# Patient Record
Sex: FEMALE | Race: WHITE | NOT HISPANIC OR LATINO | Employment: FULL TIME | URBAN - METROPOLITAN AREA
[De-identification: names, ages, dates, MRNs, and addresses within clinical notes are randomized per-mention and may not be internally consistent; named-entity substitution may affect disease eponyms.]

---

## 2023-03-01 LAB
ALANINE AMINOTRANSFERASE (SGPT) (U/L) IN SER/PLAS: 7 U/L (ref 7–45)
ALBUMIN (G/DL) IN SER/PLAS: 4.7 G/DL (ref 3.4–5)
ALKALINE PHOSPHATASE (U/L) IN SER/PLAS: 52 U/L (ref 33–110)
ANION GAP IN SER/PLAS: 14 MMOL/L (ref 10–20)
ASPARTATE AMINOTRANSFERASE (SGOT) (U/L) IN SER/PLAS: 13 U/L (ref 9–39)
BILIRUBIN TOTAL (MG/DL) IN SER/PLAS: 0.6 MG/DL (ref 0–1.2)
CALCIUM (MG/DL) IN SER/PLAS: 10.1 MG/DL (ref 8.6–10.6)
CARBON DIOXIDE, TOTAL (MMOL/L) IN SER/PLAS: 26 MMOL/L (ref 21–32)
CHLORIDE (MMOL/L) IN SER/PLAS: 103 MMOL/L (ref 98–107)
CHOLESTEROL (MG/DL) IN SER/PLAS: 233 MG/DL (ref 0–199)
CHOLESTEROL IN HDL (MG/DL) IN SER/PLAS: 56.2 MG/DL
CHOLESTEROL/HDL RATIO: 4.1
CREATININE (MG/DL) IN SER/PLAS: 0.81 MG/DL (ref 0.5–1.05)
GFR FEMALE: >90 ML/MIN/1.73M2
GLUCOSE (MG/DL) IN SER/PLAS: 97 MG/DL (ref 74–99)
LDL: 152 MG/DL (ref 0–99)
POTASSIUM (MMOL/L) IN SER/PLAS: 4.6 MMOL/L (ref 3.5–5.3)
PROTEIN TOTAL: 7.5 G/DL (ref 6.4–8.2)
SODIUM (MMOL/L) IN SER/PLAS: 138 MMOL/L (ref 136–145)
THYROTROPIN (MIU/L) IN SER/PLAS BY DETECTION LIMIT <= 0.05 MIU/L: 1.43 MIU/L (ref 0.44–3.98)
THYROXINE (T4) FREE (NG/DL) IN SER/PLAS: 1.35 NG/DL (ref 0.78–1.48)
TRIGLYCERIDE (MG/DL) IN SER/PLAS: 124 MG/DL (ref 0–149)
TRIIODOTHYRONINE (T3) (NG/DL) IN SER/PLAS: 113 NG/DL (ref 60–200)
UREA NITROGEN (MG/DL) IN SER/PLAS: 16 MG/DL (ref 6–23)
VLDL: 25 MG/DL (ref 0–40)

## 2023-06-16 PROBLEM — M54.16 LUMBAR RADICULOPATHY: Status: ACTIVE | Noted: 2023-06-16

## 2023-06-16 PROBLEM — L60.9 NAIL LESION: Status: ACTIVE | Noted: 2023-06-16

## 2023-06-16 PROBLEM — S33.5XXA LUMBAR SPRAIN: Status: ACTIVE | Noted: 2023-06-16

## 2023-06-16 PROBLEM — E78.5 DYSLIPIDEMIA: Status: ACTIVE | Noted: 2023-06-16

## 2023-06-16 PROBLEM — M51.369 DEGENERATIVE DISC DISEASE, LUMBAR: Status: ACTIVE | Noted: 2023-06-16

## 2023-06-16 PROBLEM — R07.9 CHEST PAIN: Status: ACTIVE | Noted: 2023-06-16

## 2023-06-16 PROBLEM — D50.9 ANEMIA, IRON DEFICIENCY: Status: ACTIVE | Noted: 2023-06-16

## 2023-06-16 PROBLEM — E03.9 PRIMARY HYPOTHYROIDISM: Status: ACTIVE | Noted: 2023-06-16

## 2023-06-16 PROBLEM — R10.2 PELVIC PAIN: Status: ACTIVE | Noted: 2023-06-16

## 2023-06-16 PROBLEM — M54.50 LOW BACK PAIN: Status: ACTIVE | Noted: 2023-06-16

## 2023-06-16 PROBLEM — R10.9 ABDOMINAL PAIN: Status: ACTIVE | Noted: 2023-06-16

## 2023-06-16 PROBLEM — M51.36 DEGENERATIVE DISC DISEASE, LUMBAR: Status: ACTIVE | Noted: 2023-06-16

## 2023-06-16 PROBLEM — R29.898 LEG WEAKNESS: Status: ACTIVE | Noted: 2023-06-16

## 2023-06-16 RX ORDER — LEVOTHYROXINE SODIUM 75 UG/1
1 TABLET ORAL DAILY
COMMUNITY
Start: 2017-02-17 | End: 2023-09-29

## 2023-06-19 ENCOUNTER — OFFICE VISIT (OUTPATIENT)
Dept: PRIMARY CARE | Facility: CLINIC | Age: 48
End: 2023-06-19
Payer: COMMERCIAL

## 2023-06-19 VITALS
TEMPERATURE: 97.2 F | SYSTOLIC BLOOD PRESSURE: 120 MMHG | RESPIRATION RATE: 16 BRPM | BODY MASS INDEX: 25.23 KG/M2 | DIASTOLIC BLOOD PRESSURE: 70 MMHG | WEIGHT: 157 LBS | HEART RATE: 80 BPM | HEIGHT: 66 IN

## 2023-06-19 DIAGNOSIS — M79.89 RIGHT LEG SWELLING: Primary | ICD-10-CM

## 2023-06-19 PROCEDURE — 1036F TOBACCO NON-USER: CPT | Performed by: INTERNAL MEDICINE

## 2023-06-19 PROCEDURE — 99213 OFFICE O/P EST LOW 20 MIN: CPT | Performed by: INTERNAL MEDICINE

## 2023-06-19 ASSESSMENT — COLUMBIA-SUICIDE SEVERITY RATING SCALE - C-SSRS
6. HAVE YOU EVER DONE ANYTHING, STARTED TO DO ANYTHING, OR PREPARED TO DO ANYTHING TO END YOUR LIFE?: NO
2. HAVE YOU ACTUALLY HAD ANY THOUGHTS OF KILLING YOURSELF?: NO
1. IN THE PAST MONTH, HAVE YOU WISHED YOU WERE DEAD OR WISHED YOU COULD GO TO SLEEP AND NOT WAKE UP?: NO

## 2023-06-19 ASSESSMENT — PATIENT HEALTH QUESTIONNAIRE - PHQ9
SUM OF ALL RESPONSES TO PHQ9 QUESTIONS 1 AND 2: 0
1. LITTLE INTEREST OR PLEASURE IN DOING THINGS: NOT AT ALL
2. FEELING DOWN, DEPRESSED OR HOPELESS: NOT AT ALL

## 2023-06-19 ASSESSMENT — ENCOUNTER SYMPTOMS: DEPRESSION: 0

## 2023-06-19 NOTE — PROGRESS NOTES
"Subjective   Patient ID: Viktoriya Carroll is a 48 y.o. female who presents for Knee Pain (Back of rt knee).  HPI  Patient presents today for a swelling posterior to her right knee.  She states that its been there for many months going all the way back to the fall.  It squishy if not hard and has not changed and has not grown it is tender and it sometimes keeps her up at night with aching.  Is been very soft the whole time.  First she thought it was a blood clot but is continued for so long she knows that it is and is also never gotten hard or calcified is just a very soft or lower extremity is not swollen    Review of Systems  HEENT negative  Lungs no chest pains or shortness of breath no wheezing  Heart cardiovascular no chest pains or palpitations  GI is negative  Objective   /70   Pulse 80   Temp 36.2 °C (97.2 °F)   Resp 16   Ht 1.676 m (5' 6\")   Wt 71.2 kg (157 lb)   BMI 25.34 kg/m²    Physical Exam  Heart regular rate rhythm no murmurs  Lungs clear bilaterally  On her bilateral lower extremities she is got a little bit of fullness posterior to her knees the right is worse than the left there is palpable fluctuance to it there are no cords.  Below the knees the calves are normal symmetrically  Assessment/Plan   Diagnoses and all orders for this visit:  Right leg swelling  -     Vascular US lower extremity venous duplex right; Future    I suspect this might be a Baker's cyst  We will check an ultrasound also to rule out clot and evaluate for cyst  We may refer to Ortho depending on what the ultrasound shows  Gwendolyn Mercado MD   "

## 2023-06-26 DIAGNOSIS — M79.89 RIGHT LEG SWELLING: ICD-10-CM

## 2023-07-18 LAB
BASOPHILS (10*3/UL) IN BLOOD BY AUTOMATED COUNT: 0.03 X10E9/L (ref 0–0.1)
BASOPHILS/100 LEUKOCYTES IN BLOOD BY AUTOMATED COUNT: 0.7 % (ref 0–2)
EOSINOPHILS (10*3/UL) IN BLOOD BY AUTOMATED COUNT: 0.04 X10E9/L (ref 0–0.7)
EOSINOPHILS/100 LEUKOCYTES IN BLOOD BY AUTOMATED COUNT: 0.9 % (ref 0–6)
ERYTHROCYTE DISTRIBUTION WIDTH (RATIO) BY AUTOMATED COUNT: 12.2 % (ref 11.5–14.5)
ERYTHROCYTE MEAN CORPUSCULAR HEMOGLOBIN CONCENTRATION (G/DL) BY AUTOMATED: 33.2 G/DL (ref 32–36)
ERYTHROCYTE MEAN CORPUSCULAR VOLUME (FL) BY AUTOMATED COUNT: 92 FL (ref 80–100)
ERYTHROCYTES (10*6/UL) IN BLOOD BY AUTOMATED COUNT: 4.29 X10E12/L (ref 4–5.2)
HEMATOCRIT (%) IN BLOOD BY AUTOMATED COUNT: 39.4 % (ref 36–46)
HEMOGLOBIN (G/DL) IN BLOOD: 13.1 G/DL (ref 12–16)
IMMATURE GRANULOCYTES/100 LEUKOCYTES IN BLOOD BY AUTOMATED COUNT: 0 % (ref 0–0.9)
LEUKOCYTES (10*3/UL) IN BLOOD BY AUTOMATED COUNT: 4.5 X10E9/L (ref 4.4–11.3)
LYMPHOCYTES (10*3/UL) IN BLOOD BY AUTOMATED COUNT: 1.54 X10E9/L (ref 1.2–4.8)
LYMPHOCYTES/100 LEUKOCYTES IN BLOOD BY AUTOMATED COUNT: 34.6 % (ref 13–44)
MONOCYTES (10*3/UL) IN BLOOD BY AUTOMATED COUNT: 0.38 X10E9/L (ref 0.1–1)
MONOCYTES/100 LEUKOCYTES IN BLOOD BY AUTOMATED COUNT: 8.5 % (ref 2–10)
NEUTROPHILS (10*3/UL) IN BLOOD BY AUTOMATED COUNT: 2.46 X10E9/L (ref 1.2–7.7)
NEUTROPHILS/100 LEUKOCYTES IN BLOOD BY AUTOMATED COUNT: 55.3 % (ref 40–80)
NRBC (PER 100 WBCS) BY AUTOMATED COUNT: 0 /100 WBC (ref 0–0)
PLATELETS (10*3/UL) IN BLOOD AUTOMATED COUNT: 296 X10E9/L (ref 150–450)

## 2023-07-19 LAB
ALANINE AMINOTRANSFERASE (SGPT) (U/L) IN SER/PLAS: 6 U/L (ref 7–45)
ALBUMIN (G/DL) IN SER/PLAS: 4.5 G/DL (ref 3.4–5)
ALKALINE PHOSPHATASE (U/L) IN SER/PLAS: 47 U/L (ref 33–110)
ANION GAP IN SER/PLAS: 15 MMOL/L (ref 10–20)
ASPARTATE AMINOTRANSFERASE (SGOT) (U/L) IN SER/PLAS: 12 U/L (ref 9–39)
BILIRUBIN TOTAL (MG/DL) IN SER/PLAS: 0.7 MG/DL (ref 0–1.2)
CALCIUM (MG/DL) IN SER/PLAS: 9.5 MG/DL (ref 8.6–10.6)
CARBON DIOXIDE, TOTAL (MMOL/L) IN SER/PLAS: 25 MMOL/L (ref 21–32)
CHLORIDE (MMOL/L) IN SER/PLAS: 104 MMOL/L (ref 98–107)
CREATININE (MG/DL) IN SER/PLAS: 0.81 MG/DL (ref 0.5–1.05)
FERRITIN (UG/LL) IN SER/PLAS: 29 UG/L (ref 8–150)
GFR FEMALE: >90 ML/MIN/1.73M2
GLUCOSE (MG/DL) IN SER/PLAS: 79 MG/DL (ref 74–99)
IRON (UG/DL) IN SER/PLAS: 100 UG/DL (ref 35–150)
IRON BINDING CAPACITY (UG/DL) IN SER/PLAS: 332 UG/DL (ref 240–445)
IRON SATURATION (%) IN SER/PLAS: 30 % (ref 25–45)
POTASSIUM (MMOL/L) IN SER/PLAS: 4.2 MMOL/L (ref 3.5–5.3)
PROTEIN TOTAL: 7 G/DL (ref 6.4–8.2)
SODIUM (MMOL/L) IN SER/PLAS: 140 MMOL/L (ref 136–145)
UREA NITROGEN (MG/DL) IN SER/PLAS: 10 MG/DL (ref 6–23)

## 2023-07-20 LAB
IMMUNOGLOBULIN LIGHT CHAINS KAPPA/LAMBDA (MASS RATIO) IN SERUM: 1.46 (ref 0.26–1.65)
IMMUNOGLOBULIN LIGHT CHAINS.KAPPA (MG/DL) IN SERUM: 2.81 MG/DL (ref 0.33–1.94)
IMMUNOGLOBULIN LIGHT CHAINS.LAMBDA (MG/DL) IN SERUM: 1.92 MG/DL (ref 0.57–2.63)

## 2023-07-24 LAB
ALBUMIN ELP: 4.2 G/DL (ref 3.4–5)
ALPHA 1: 0.3 G/DL (ref 0.2–0.6)
ALPHA 2: 0.6 G/DL (ref 0.4–1.1)
BETA: 0.8 G/DL (ref 0.5–1.2)
GAMMA GLOBULIN: 1.1 G/DL (ref 0.5–1.4)
PATH REVIEW - SERUM IMMUNOFIXATION: NORMAL
PATH REVIEW-SERUM PROTEIN ELECTROPHORESIS: NORMAL
PROTEIN ELECTROPHORESIS INTERPRETATION: NORMAL
PROTEIN TOTAL: 7 G/DL (ref 6.4–8.2)
SERUM IMMUNOFIXATION INTERPRETATION: NORMAL

## 2023-08-29 LAB
IMMUNOGLOBULIN LIGHT CHAINS KAPPA/LAMBDA (MASS RATIO) IN SERUM: NORMAL
IMMUNOGLOBULIN LIGHT CHAINS.KAPPA (MG/DL) IN SERUM: NORMAL
IMMUNOGLOBULIN LIGHT CHAINS.LAMBDA (MG/DL) IN SERUM: NORMAL

## 2023-09-29 ENCOUNTER — TELEPHONE (OUTPATIENT)
Dept: PRIMARY CARE | Facility: CLINIC | Age: 48
End: 2023-09-29
Payer: COMMERCIAL

## 2023-09-29 DIAGNOSIS — E03.9 PRIMARY HYPOTHYROIDISM: ICD-10-CM

## 2023-09-29 DIAGNOSIS — E03.9 HYPOTHYROIDISM, UNSPECIFIED: ICD-10-CM

## 2023-09-29 DIAGNOSIS — E78.5 DYSLIPIDEMIA: ICD-10-CM

## 2023-09-29 RX ORDER — LEVOTHYROXINE SODIUM 75 UG/1
75 TABLET ORAL DAILY
Qty: 90 TABLET | Refills: 1 | Status: SHIPPED | OUTPATIENT
Start: 2023-09-29 | End: 2023-10-13 | Stop reason: SDUPTHER

## 2023-09-29 NOTE — TELEPHONE ENCOUNTER
Pt called stating she has 1 wk of her synthroid and would like a b/w order.  She will need a refill.

## 2023-10-11 ENCOUNTER — LAB (OUTPATIENT)
Dept: LAB | Facility: LAB | Age: 48
End: 2023-10-11
Payer: COMMERCIAL

## 2023-10-11 DIAGNOSIS — E03.9 PRIMARY HYPOTHYROIDISM: ICD-10-CM

## 2023-10-11 DIAGNOSIS — E78.5 DYSLIPIDEMIA: ICD-10-CM

## 2023-10-11 LAB
ALBUMIN SERPL BCP-MCNC: 4.3 G/DL (ref 3.4–5)
ALP SERPL-CCNC: 45 U/L (ref 33–110)
ALT SERPL W P-5'-P-CCNC: 7 U/L (ref 7–45)
ANION GAP SERPL CALC-SCNC: 15 MMOL/L (ref 10–20)
AST SERPL W P-5'-P-CCNC: 12 U/L (ref 9–39)
BILIRUB SERPL-MCNC: 0.6 MG/DL (ref 0–1.2)
BUN SERPL-MCNC: 15 MG/DL (ref 6–23)
CALCIUM SERPL-MCNC: 9.6 MG/DL (ref 8.6–10.6)
CHLORIDE SERPL-SCNC: 104 MMOL/L (ref 98–107)
CHOLEST SERPL-MCNC: 186 MG/DL (ref 0–199)
CHOLESTEROL/HDL RATIO: 4
CO2 SERPL-SCNC: 25 MMOL/L (ref 21–32)
CREAT SERPL-MCNC: 0.75 MG/DL (ref 0.5–1.05)
GFR SERPL CREATININE-BSD FRML MDRD: >90 ML/MIN/1.73M*2
GLUCOSE SERPL-MCNC: 103 MG/DL (ref 74–99)
HDLC SERPL-MCNC: 46.7 MG/DL
LDLC SERPL CALC-MCNC: 118 MG/DL (ref 140–190)
NON HDL CHOLESTEROL: 139 MG/DL (ref 0–149)
POTASSIUM SERPL-SCNC: 4.6 MMOL/L (ref 3.5–5.3)
PROT SERPL-MCNC: 7.2 G/DL (ref 6.4–8.2)
SODIUM SERPL-SCNC: 139 MMOL/L (ref 136–145)
T3 SERPL-MCNC: 108 NG/DL (ref 60–200)
T4 FREE SERPL-MCNC: 1.2 NG/DL (ref 0.78–1.48)
TRIGL SERPL-MCNC: 106 MG/DL (ref 0–149)
TSH SERPL-ACNC: 1.05 MIU/L (ref 0.44–3.98)
VLDL: 21 MG/DL (ref 0–40)

## 2023-10-11 PROCEDURE — 84480 ASSAY TRIIODOTHYRONINE (T3): CPT

## 2023-10-11 PROCEDURE — 36415 COLL VENOUS BLD VENIPUNCTURE: CPT

## 2023-10-11 PROCEDURE — 84439 ASSAY OF FREE THYROXINE: CPT

## 2023-10-11 PROCEDURE — 80053 COMPREHEN METABOLIC PANEL: CPT

## 2023-10-11 PROCEDURE — 84443 ASSAY THYROID STIM HORMONE: CPT

## 2023-10-11 PROCEDURE — 80061 LIPID PANEL: CPT

## 2023-10-13 ENCOUNTER — OFFICE VISIT (OUTPATIENT)
Dept: PRIMARY CARE | Facility: CLINIC | Age: 48
End: 2023-10-13
Payer: COMMERCIAL

## 2023-10-13 VITALS
SYSTOLIC BLOOD PRESSURE: 118 MMHG | TEMPERATURE: 98.2 F | DIASTOLIC BLOOD PRESSURE: 78 MMHG | WEIGHT: 154 LBS | BODY MASS INDEX: 26.29 KG/M2 | OXYGEN SATURATION: 98 % | HEART RATE: 83 BPM | HEIGHT: 64 IN

## 2023-10-13 DIAGNOSIS — E03.9 HYPOTHYROIDISM, UNSPECIFIED: ICD-10-CM

## 2023-10-13 DIAGNOSIS — Z12.11 COLON CANCER SCREENING: ICD-10-CM

## 2023-10-13 DIAGNOSIS — Z00.00 ROUTINE GENERAL MEDICAL EXAMINATION AT A HEALTH CARE FACILITY: Primary | ICD-10-CM

## 2023-10-13 PROCEDURE — 1036F TOBACCO NON-USER: CPT | Performed by: INTERNAL MEDICINE

## 2023-10-13 PROCEDURE — 99396 PREV VISIT EST AGE 40-64: CPT | Performed by: INTERNAL MEDICINE

## 2023-10-13 RX ORDER — LEVOTHYROXINE SODIUM 75 UG/1
75 TABLET ORAL DAILY
Qty: 90 TABLET | Refills: 1 | Status: SHIPPED | OUTPATIENT
Start: 2023-10-13 | End: 2024-04-24 | Stop reason: SDUPTHER

## 2023-10-13 ASSESSMENT — PATIENT HEALTH QUESTIONNAIRE - PHQ9
1. LITTLE INTEREST OR PLEASURE IN DOING THINGS: NOT AT ALL
SUM OF ALL RESPONSES TO PHQ9 QUESTIONS 1 AND 2: 0
2. FEELING DOWN, DEPRESSED OR HOPELESS: NOT AT ALL

## 2023-10-13 NOTE — PROGRESS NOTES
Subjective   Patient ID: Viktoriya Carroll is a 48 y.o. female who presents for Follow-up (EP.  Follow up.  Labs done this week.  No concerns.).  HPI  Viktoriya Carroll is here for annual check-up.      Concerns none sees dr doherty for hematology     Reported Health good    Dental visits red  Vision checks reg    Diet healthy  intermittent   Exercise  swims and stretches   Caffeine 1 c  Tobacco never  Alcohol  social rare    Female : Menstrual status /pregnancy status  reg on time  even after ablation    Colorectal cancer screening  never  due     Current issues patient denies current issues.  She is stable on her Synthroid she has no issues.  She is seeing hematology for follow-up of red blood cell anomalies and no diagnosis was made her situation is stable she is being followed    Review of Systems  GENERAL - Denies fever, fatigue or chills  SKIN - Denies rash, new skin lesions, or change in moles  EYES - Denies blurred vision, or change in visual acuity  EARS - Denies ear pain, discharge, ringing, or difficulty hearing  NOSE - Denies nasal congestion, discharge, or bleeding  MOUTH - Denies sore throat, postnasal drip or painful/difficulty swallowing  NECK - Denies pain or swelling  RESPIRATORY - Denies shortness of breath, cough, wheezing  CARDIOVASCULAR - Denies palpitations, chest pain, orthopnea, peripheral edema, syncope or claudication  GASTROINTESTINAL - Denies nausea, vomiting, diarrhea, constipation, abdominal pain, melena and or bright red blood  GENITOURINARY - Denies dysuria, frequency of urination, urgency, or hesitancy  MUSCULOSKELETAL - Denies joint or muscle pain, or back pain  NEUROLOGICAL - Denies localized numbness, weakness, or tingling  PSYCHIATRIC - Denies depression, anxiety, substance abuse, suicidal or homicidal ideation  ENDOCRINE - Denies heat or cold intolerance, weight loss or gain, increasing thirst  HEMATO-IMMUNOLOGIC - Denies easy bruising, bleeding, oral ulcerations or  "recurrent infections      Objective   /78   Pulse 83   Temp 36.8 °C (98.2 °F) (Oral)   Ht 1.626 m (5' 4\")   Wt 69.9 kg (154 lb)   LMP 09/20/2023 (Approximate)   SpO2 98%   BMI 26.43 kg/m²    Physical Exam  CONSTITUTIONAL - well nourished, well developed, looks like stated age, in no acute distress, not ill-appearing, and not tired appearing  SKIN - normal skin color and pigmentation, normal skin turgor without rash, lesions, or nodules visualized  HEAD - no trauma, normocephalic  EYES -normal extraocular movements are intact  ENT - TM's intact, no injection, no exudate,  nasal passage without discharge and patent  NECK - supple without rigidity, no neck mass was observed, no thyromegaly or thyroid nodules  CHEST - clear to auscultation, no wheezing, no crackles and no rales, good effort  CARDIAC - regular rate and regular rhythm, no skipped beats, no murmur  ABDOMEN - no organomegaly, soft, nontender, nondistended, normal bowel sounds, no guarding/rebound/rigidity  EXTREMITIES - no edema, no deformities  NEUROLOGICAL -grossly intact  PSYCHIATRIC - alert, pleasant and cordial, age-appropriate  LYMPHATIC- no cervical lymphadenopathy    Assessment/Plan   Diagnoses and all orders for this visit:  Routine general medical examination at a health care facility  Hypothyroidism, unspecified  -     Synthroid 75 mcg tablet; Take 1 tablet (75 mcg) by mouth once daily.  -     Thyroid Stimulating Hormone; Future  -     Thyroxine, Free; Future  -     Triiodothyronine, Free; Future  -     Follow Up In [facility] - PCP - Established; Future  Colon cancer screening  -     Referral to Gastroenterology; Future    Blood work is reviewed  She follows for CBC with Dr. Araya  She needs a colonoscopy  Follow-up with me in 6 months blood work before  Gwendolyn Mercaod MD   "

## 2023-12-08 ENCOUNTER — APPOINTMENT (OUTPATIENT)
Dept: PRIMARY CARE | Facility: CLINIC | Age: 48
End: 2023-12-08
Payer: COMMERCIAL

## 2024-04-10 ENCOUNTER — OFFICE VISIT (OUTPATIENT)
Dept: PRIMARY CARE | Facility: CLINIC | Age: 49
End: 2024-04-10
Payer: COMMERCIAL

## 2024-04-10 ENCOUNTER — TELEPHONE (OUTPATIENT)
Dept: PRIMARY CARE | Facility: CLINIC | Age: 49
End: 2024-04-10

## 2024-04-10 VITALS
TEMPERATURE: 97.8 F | OXYGEN SATURATION: 99 % | DIASTOLIC BLOOD PRESSURE: 70 MMHG | WEIGHT: 152 LBS | HEART RATE: 82 BPM | HEIGHT: 66 IN | SYSTOLIC BLOOD PRESSURE: 114 MMHG | BODY MASS INDEX: 24.43 KG/M2

## 2024-04-10 DIAGNOSIS — H60.92 OTITIS EXTERNA OF LEFT EAR, UNSPECIFIED CHRONICITY, UNSPECIFIED TYPE: ICD-10-CM

## 2024-04-10 DIAGNOSIS — H60.92 OTITIS EXTERNA OF LEFT EAR, UNSPECIFIED CHRONICITY, UNSPECIFIED TYPE: Primary | ICD-10-CM

## 2024-04-10 PROCEDURE — 1036F TOBACCO NON-USER: CPT | Performed by: INTERNAL MEDICINE

## 2024-04-10 PROCEDURE — 99213 OFFICE O/P EST LOW 20 MIN: CPT | Performed by: INTERNAL MEDICINE

## 2024-04-10 RX ORDER — OFLOXACIN 3 MG/ML
10 SOLUTION AURICULAR (OTIC) 2 TIMES DAILY
Qty: 10 ML | Refills: 0 | Status: SHIPPED | OUTPATIENT
Start: 2024-04-10 | End: 2024-04-20

## 2024-04-10 RX ORDER — OFLOXACIN 3 MG/ML
10 SOLUTION AURICULAR (OTIC) 2 TIMES DAILY
Qty: 5 ML | Refills: 0 | Status: SHIPPED | OUTPATIENT
Start: 2024-04-10 | End: 2024-04-10 | Stop reason: SDUPTHER

## 2024-04-10 ASSESSMENT — ENCOUNTER SYMPTOMS
DEPRESSION: 0
OCCASIONAL FEELINGS OF UNSTEADINESS: 0
LOSS OF SENSATION IN FEET: 0

## 2024-04-10 NOTE — PROGRESS NOTES
"Subjective   Patient ID: Viktoriya Carroll is a 48 y.o. female who presents for ear bleeding (EP.  L ear bleeding, crackling and muffling sound.  No pain.  Just noticed today.).  HPI  Today when cleaned ears after shower saw blood on 2 qtips   Has a crackle in the  ear or muffled that comes and goes   No ear pain    [place] and [place] maybe [place] got in ear  She has not had any pain she has no hearing loss she has no recent cold symptoms  She has a history of an old ear perforation she does not know which side she thinks was about a year ago  Review of Systems  Review of systems was performed and is otherwise negative except as noted in HPI.    Objective   /70   Pulse 82   Temp 36.6 °C (97.8 °F) (Oral)   Ht 1.676 m (5' 6\")   Wt 68.9 kg (152 lb)   LMP 04/07/2024   SpO2 99%   BMI 24.53 kg/m²    Physical Exam  HEENT right TM is normal left TM is normal left canal she has a small coagulation of blood between 3 and 5:00 on the lateral aspect of the ear canal just inside the opening no redness no swelling no pain with manipulation of the ear  Nares are normal bilaterally  Oropharynx is normal  Neck is supple no lymphadenopathy  Lungs are clear bilaterally  Heart is regular rate and rhythm  Assessment/Plan   Diagnoses and all orders for this visit:  Otitis externa of left ear, unspecified chronicity, unspecified type  -     ofloxacin (Floxin) 0.3 % otic solution; Administer 10 drops into the left ear 2 times a day for 10 days.    Call with issues  Prescription for antibiotic drops as prescribed  She will keep follow-up appointment with me in a few weeks for her physical and we will recheck at that time    Gwendolyn Mercado MD   "

## 2024-04-10 NOTE — TELEPHONE ENCOUNTER
Spoke w/Blanca [facility], Ofloxacin RX will run out in 5 days. Blanca stated you can order a refill on RX or put script in for 2 bottles. Blanca ph# [phone]

## 2024-04-19 ENCOUNTER — LAB (OUTPATIENT)
Dept: LAB | Facility: LAB | Age: 49
End: 2024-04-19
Payer: COMMERCIAL

## 2024-04-19 DIAGNOSIS — E03.9 HYPOTHYROIDISM, UNSPECIFIED: ICD-10-CM

## 2024-04-19 DIAGNOSIS — N92.6 IRREGULAR MENSTRUATION, UNSPECIFIED: Primary | ICD-10-CM

## 2024-04-19 LAB
PROLACTIN SERPL-MCNC: 10.4 UG/L (ref 3–20)
T3FREE SERPL-MCNC: 3.1 PG/ML (ref 2.3–4.2)
T4 FREE SERPL-MCNC: 1.21 NG/DL (ref 0.78–1.48)
TSH SERPL-ACNC: 1.81 MIU/L (ref 0.44–3.98)

## 2024-04-19 PROCEDURE — 84439 ASSAY OF FREE THYROXINE: CPT

## 2024-04-19 PROCEDURE — 36415 COLL VENOUS BLD VENIPUNCTURE: CPT

## 2024-04-19 PROCEDURE — 84146 ASSAY OF PROLACTIN: CPT

## 2024-04-19 PROCEDURE — 84443 ASSAY THYROID STIM HORMONE: CPT

## 2024-04-19 PROCEDURE — 84481 FREE ASSAY (FT-3): CPT

## 2024-04-24 ENCOUNTER — OFFICE VISIT (OUTPATIENT)
Dept: PRIMARY CARE | Facility: CLINIC | Age: 49
End: 2024-04-24
Payer: COMMERCIAL

## 2024-04-24 VITALS
OXYGEN SATURATION: 99 % | DIASTOLIC BLOOD PRESSURE: 68 MMHG | WEIGHT: 151 LBS | HEART RATE: 85 BPM | HEIGHT: 66 IN | BODY MASS INDEX: 24.27 KG/M2 | SYSTOLIC BLOOD PRESSURE: 116 MMHG | TEMPERATURE: 98.1 F

## 2024-04-24 DIAGNOSIS — Z00.00 ROUTINE GENERAL MEDICAL EXAMINATION AT A HEALTH CARE FACILITY: Primary | ICD-10-CM

## 2024-04-24 DIAGNOSIS — M79.641 PAIN OF RIGHT HAND: ICD-10-CM

## 2024-04-24 DIAGNOSIS — E03.9 HYPOTHYROIDISM, UNSPECIFIED: ICD-10-CM

## 2024-04-24 DIAGNOSIS — H69.92 DYSFUNCTION OF LEFT EUSTACHIAN TUBE: ICD-10-CM

## 2024-04-24 PROCEDURE — 1036F TOBACCO NON-USER: CPT | Performed by: INTERNAL MEDICINE

## 2024-04-24 PROCEDURE — 99396 PREV VISIT EST AGE 40-64: CPT | Performed by: INTERNAL MEDICINE

## 2024-04-24 RX ORDER — LEVOTHYROXINE SODIUM 75 UG/1
75 TABLET ORAL DAILY
Qty: 90 TABLET | Refills: 1 | Status: SHIPPED | OUTPATIENT
Start: 2024-04-24

## 2024-04-24 ASSESSMENT — ENCOUNTER SYMPTOMS
LOSS OF SENSATION IN FEET: 0
DEPRESSION: 0
OCCASIONAL FEELINGS OF UNSTEADINESS: 0

## 2024-04-24 ASSESSMENT — PATIENT HEALTH QUESTIONNAIRE - PHQ9
SUM OF ALL RESPONSES TO PHQ9 QUESTIONS 1 AND 2: 0
2. FEELING DOWN, DEPRESSED OR HOPELESS: NOT AT ALL
1. LITTLE INTEREST OR PLEASURE IN DOING THINGS: NOT AT ALL

## 2024-06-19 ENCOUNTER — TELEPHONE (OUTPATIENT)
Dept: HEMATOLOGY/ONCOLOGY | Facility: CLINIC | Age: 49
End: 2024-06-19
Payer: COMMERCIAL

## 2024-06-19 NOTE — TELEPHONE ENCOUNTER
Contacted patient regarding cancellation of appointment 07/19/23.  Patient states she is following with Dr. Kwon at [facility].

## 2024-07-19 ENCOUNTER — APPOINTMENT (OUTPATIENT)
Dept: HEMATOLOGY/ONCOLOGY | Facility: CLINIC | Age: 49
End: 2024-07-19
Payer: COMMERCIAL

## 2024-10-22 ENCOUNTER — LAB (OUTPATIENT)
Dept: LAB | Facility: LAB | Age: 49
End: 2024-10-22
Payer: COMMERCIAL

## 2024-10-22 DIAGNOSIS — Z00.00 ROUTINE GENERAL MEDICAL EXAMINATION AT A HEALTH CARE FACILITY: ICD-10-CM

## 2024-10-22 LAB
ALBUMIN SERPL BCP-MCNC: 4.1 G/DL (ref 3.4–5)
ALP SERPL-CCNC: 51 U/L (ref 33–110)
ALT SERPL W P-5'-P-CCNC: 10 U/L (ref 7–45)
ANION GAP SERPL CALC-SCNC: 11 MMOL/L (ref 10–20)
AST SERPL W P-5'-P-CCNC: 14 U/L (ref 9–39)
BASOPHILS # BLD AUTO: 0.04 X10*3/UL (ref 0–0.1)
BASOPHILS NFR BLD AUTO: 0.8 %
BILIRUB SERPL-MCNC: 0.5 MG/DL (ref 0–1.2)
BUN SERPL-MCNC: 18 MG/DL (ref 6–23)
CALCIUM SERPL-MCNC: 9.6 MG/DL (ref 8.6–10.6)
CHLORIDE SERPL-SCNC: 104 MMOL/L (ref 98–107)
CHOLEST SERPL-MCNC: 238 MG/DL (ref 0–199)
CHOLESTEROL/HDL RATIO: 4.3
CO2 SERPL-SCNC: 30 MMOL/L (ref 21–32)
CREAT SERPL-MCNC: 0.83 MG/DL (ref 0.5–1.05)
EGFRCR SERPLBLD CKD-EPI 2021: 88 ML/MIN/1.73M*2
EOSINOPHIL # BLD AUTO: 0.05 X10*3/UL (ref 0–0.7)
EOSINOPHIL NFR BLD AUTO: 1 %
ERYTHROCYTE [DISTWIDTH] IN BLOOD BY AUTOMATED COUNT: 12.2 % (ref 11.5–14.5)
GLUCOSE SERPL-MCNC: 96 MG/DL (ref 74–99)
HCT VFR BLD AUTO: 41.1 % (ref 36–46)
HDLC SERPL-MCNC: 55.9 MG/DL
HGB BLD-MCNC: 13.6 G/DL (ref 12–16)
IMM GRANULOCYTES # BLD AUTO: 0.01 X10*3/UL (ref 0–0.7)
IMM GRANULOCYTES NFR BLD AUTO: 0.2 % (ref 0–0.9)
LDLC SERPL CALC-MCNC: 154 MG/DL
LYMPHOCYTES # BLD AUTO: 1.47 X10*3/UL (ref 1.2–4.8)
LYMPHOCYTES NFR BLD AUTO: 30.3 %
MCH RBC QN AUTO: 30.8 PG (ref 26–34)
MCHC RBC AUTO-ENTMCNC: 33.1 G/DL (ref 32–36)
MCV RBC AUTO: 93 FL (ref 80–100)
MONOCYTES # BLD AUTO: 0.36 X10*3/UL (ref 0.1–1)
MONOCYTES NFR BLD AUTO: 7.4 %
NEUTROPHILS # BLD AUTO: 2.92 X10*3/UL (ref 1.2–7.7)
NEUTROPHILS NFR BLD AUTO: 60.3 %
NON HDL CHOLESTEROL: 182 MG/DL (ref 0–149)
NRBC BLD-RTO: 0 /100 WBCS (ref 0–0)
PLATELET # BLD AUTO: 345 X10*3/UL (ref 150–450)
POTASSIUM SERPL-SCNC: 4.3 MMOL/L (ref 3.5–5.3)
PROT SERPL-MCNC: 6.9 G/DL (ref 6.4–8.2)
RBC # BLD AUTO: 4.41 X10*6/UL (ref 4–5.2)
SODIUM SERPL-SCNC: 141 MMOL/L (ref 136–145)
T3FREE SERPL-MCNC: 3.1 PG/ML (ref 2.3–4.2)
T4 FREE SERPL-MCNC: 1.25 NG/DL (ref 0.78–1.48)
TRIGL SERPL-MCNC: 141 MG/DL (ref 0–149)
TSH SERPL-ACNC: 1.73 MIU/L (ref 0.44–3.98)
VLDL: 28 MG/DL (ref 0–40)
WBC # BLD AUTO: 4.9 X10*3/UL (ref 4.4–11.3)

## 2024-10-22 PROCEDURE — 84443 ASSAY THYROID STIM HORMONE: CPT

## 2024-10-22 PROCEDURE — 36415 COLL VENOUS BLD VENIPUNCTURE: CPT

## 2024-10-22 PROCEDURE — 80061 LIPID PANEL: CPT

## 2024-10-22 PROCEDURE — 80053 COMPREHEN METABOLIC PANEL: CPT

## 2024-10-22 PROCEDURE — 85025 COMPLETE CBC W/AUTO DIFF WBC: CPT

## 2024-10-22 PROCEDURE — 84439 ASSAY OF FREE THYROXINE: CPT

## 2024-10-22 PROCEDURE — 84481 FREE ASSAY (FT-3): CPT

## 2024-10-25 ENCOUNTER — APPOINTMENT (OUTPATIENT)
Dept: PRIMARY CARE | Facility: CLINIC | Age: 49
End: 2024-10-25
Payer: COMMERCIAL

## 2024-10-25 VITALS
SYSTOLIC BLOOD PRESSURE: 118 MMHG | OXYGEN SATURATION: 98 % | HEIGHT: 66 IN | DIASTOLIC BLOOD PRESSURE: 76 MMHG | HEART RATE: 77 BPM | TEMPERATURE: 98.1 F | WEIGHT: 154 LBS | BODY MASS INDEX: 24.75 KG/M2

## 2024-10-25 DIAGNOSIS — E03.9 HYPOTHYROIDISM, UNSPECIFIED: ICD-10-CM

## 2024-10-25 DIAGNOSIS — K21.9 GASTROESOPHAGEAL REFLUX DISEASE WITHOUT ESOPHAGITIS: Primary | ICD-10-CM

## 2024-10-25 DIAGNOSIS — M06.00 RHEUMATOID ARTHRITIS WITH NEGATIVE RHEUMATOID FACTOR, INVOLVING UNSPECIFIED SITE (MULTI): ICD-10-CM

## 2024-10-25 DIAGNOSIS — Z00.00 ROUTINE GENERAL MEDICAL EXAMINATION AT A HEALTH CARE FACILITY: ICD-10-CM

## 2024-10-25 RX ORDER — LEVOTHYROXINE SODIUM 75 UG/1
75 TABLET ORAL DAILY
Qty: 90 TABLET | Refills: 1 | Status: SHIPPED | OUTPATIENT
Start: 2024-10-25

## 2024-10-25 RX ORDER — FAMOTIDINE 40 MG/1
40 TABLET, FILM COATED ORAL DAILY
Qty: 30 TABLET | Refills: 1 | Status: SHIPPED | OUTPATIENT
Start: 2024-10-25 | End: 2025-04-23

## 2024-10-25 ASSESSMENT — ENCOUNTER SYMPTOMS
DEPRESSION: 0
LOSS OF SENSATION IN FEET: 0
OCCASIONAL FEELINGS OF UNSTEADINESS: 0

## 2024-10-25 ASSESSMENT — PATIENT HEALTH QUESTIONNAIRE - PHQ9
2. FEELING DOWN, DEPRESSED OR HOPELESS: NOT AT ALL
SUM OF ALL RESPONSES TO PHQ9 QUESTIONS 1 AND 2: 0
1. LITTLE INTEREST OR PLEASURE IN DOING THINGS: NOT AT ALL

## 2024-10-25 NOTE — PROGRESS NOTES
"Subjective   Patient ID: Viktoriya Carroll is a 49 y.o. female who presents for Follow-up (EP.  Follow up thyroid.  Labs done.  Rash on entire body in September and was seen in urgent care and dermatology  and was on prednisone for 20 days.  Indigestion and discomfort from that for a few weeks on and off.).    HPI    Here for fu thyroid.  She has been feeling well.  She feels her medication is working well.  No energy issues no problems with constipation or diarrhea her weight has been normal she has no hair issues   Had rash for 1 and 1/2 mos and was to UC  and derma nd was on long  term pred  for  20 days  and they wanted to do dupixent however she knows she has possible rheumatoid arthritis and her previous rheumatologist wanted to start her on Plaquenil.  She wants a new rheumatologist and a second opinion  She does not want to take any kind of immune modulators if she does not have to so she deferred on the Dupixent and her symptoms are mostly gone she mentions some supportively  She has been having some acid reflux and heartburn.  She has a lot of stress.  Both of her in-laws are sick and she has been her caregivers.  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      Objective   /76   Pulse 77   Temp 36.7 °C (98.1 °F) (Oral)   Ht 1.676 m (5' 6\")   Wt 69.9 kg (154 lb)   LMP 10/11/2024   SpO2 98%   BMI 24.86 kg/m²      Physical Exam  HEENT is normal  Lungs clear bilaterally  Heart is regular rate rhythm no murmurs  Abdomen benign  Lower extremities no edema     Assessment/Plan   Diagnoses and all orders for this visit:  Gastroesophageal reflux disease without esophagitis  -     famotidine (Pepcid) 40 mg tablet; Take 1 tablet (40 mg) by " mouth once daily.  Hypothyroidism, unspecified  -     Synthroid 75 mcg tablet; Take 1 tablet (75 mcg) by mouth once daily.  Rheumatoid arthritis with negative rheumatoid factor, involving unspecified site (Multi)  -     Referral to Rheumatology; Future    Rheumatology referral placed  Refill medication  Will start famotidine for acid reflux she will let me know if it is not better in 1 to 2 weeks or if symptoms evolve and I will refer to cardiology  Follow-up with me in 6 months      Gwendolyn Mercado MD

## 2024-11-22 ENCOUNTER — HOSPITAL ENCOUNTER (OUTPATIENT)
Dept: RADIOLOGY | Facility: CLINIC | Age: 49
Discharge: HOME | End: 2024-11-22
Payer: COMMERCIAL

## 2024-11-22 ENCOUNTER — APPOINTMENT (OUTPATIENT)
Dept: RHEUMATOLOGY | Facility: CLINIC | Age: 49
End: 2024-11-22
Payer: COMMERCIAL

## 2024-11-22 VITALS
BODY MASS INDEX: 25.07 KG/M2 | HEART RATE: 80 BPM | WEIGHT: 156 LBS | DIASTOLIC BLOOD PRESSURE: 73 MMHG | HEIGHT: 66 IN | TEMPERATURE: 97.5 F | SYSTOLIC BLOOD PRESSURE: 112 MMHG

## 2024-11-22 DIAGNOSIS — M25.542 ARTHRALGIA OF BOTH HANDS: ICD-10-CM

## 2024-11-22 DIAGNOSIS — M25.541 ARTHRALGIA OF BOTH HANDS: ICD-10-CM

## 2024-11-22 PROCEDURE — 1036F TOBACCO NON-USER: CPT | Performed by: INTERNAL MEDICINE

## 2024-11-22 PROCEDURE — 73130 X-RAY EXAM OF HAND: CPT | Mod: 50

## 2024-11-22 PROCEDURE — 72202 X-RAY EXAM SI JOINTS 3/> VWS: CPT

## 2024-11-22 PROCEDURE — 99204 OFFICE O/P NEW MOD 45 MIN: CPT | Performed by: INTERNAL MEDICINE

## 2024-11-22 PROCEDURE — 3008F BODY MASS INDEX DOCD: CPT | Performed by: INTERNAL MEDICINE

## 2024-11-22 RX ORDER — TACROLIMUS 1 MG/G
OINTMENT TOPICAL
COMMUNITY
Start: 2024-10-15

## 2024-11-22 RX ORDER — TRIAMCINOLONE ACETONIDE 1 MG/G
CREAM TOPICAL
COMMUNITY
Start: 2024-10-15

## 2024-11-22 RX ORDER — CLOBETASOL PROPIONATE 0.5 MG/G
CREAM TOPICAL
COMMUNITY
Start: 2024-09-12

## 2024-11-22 RX ORDER — MAGNESIUM GLYCINATE 100 MG
200 CAPSULE ORAL
COMMUNITY
Start: 2024-09-11

## 2024-11-22 ASSESSMENT — RHEUMATOLOGY NEW PATIENT QUESTIONNAIRE
BLACK STOOLS: N
ABNORMAL URINE: N
EYE REDNESS: N
DOUBLE OR BLURRED VISION: N
RASH: N
DIFFICULTY FALLING ASLEEP: Y
BLOOD IN STOOLS: N
UNEXPLAINED WEIGHT CHANGE: N
UNUSUAL BLEEDING: N
MORNING STIFFNESS: Y
VOMITING OF BLOOD OR COFFEE GROUND CONSISTENCY MATERIAL: N
HEADACHES: N
EASY BRUISING: Y
DIFFICULTY BREATHING LYING DOWN: N
PERSISTENT DIARRHEA: N
EYE DRYNESS: Y
HOARSE VOICE: N
LOSS OF CONSCIOUSNESS: N
EASILY LOSING TEMPER: N
SEIZURES: N
DEPRESSION: N
NODULES/BUMPS: N
VAGINAL DRYNESS: N
JOINT SWELLING: Y
BEHAVIORAL CHANGES: N
NIGHT SWEATS: N
COLOR CHANGES OF HANDS OR FEET IN THE COLD: Y
LOSS OF VISION: N
HEARTBURN OR REFLUX: Y
SORES IN MOUTH OR NOSE: N
JAUNDICE: N
RASH OR ULCERS: N
EXCESSIVE HAIR LOSS (MORE THAN YOUR NORM): N
SWOLLEN LEGS OR FEET: N
ANXIETY: N
STOMACH PAIN: N
SKIN REDNESS: N
SKIN TIGHTNESS: N
MEMORY LOSS: N
COUGH: N
DIFFICULTY STAYING ASLEEP: N
ANEMIA: Y
PAIN OR BURNING ON URINATION: N
FAINTING: N
UNUSUALLY RAPID OR SLOWED HEART RATE: N
NAUSEA: Y
SHORTNESS OF BREATH: N
MUSCLE WEAKNESS: N
FEVER: N
UNEXPLAINED HEARING LOSS: N
EYE PAIN: N
SWOLLEN OR TENDER GLANDS: N
INCREASED SUSCEPTIBILITY TO INFECTION: N
LIST JOINTS AFFECTED BY SWELLING IN THE PAST MONTH: BILATERAL HANDS
HOW WOULD YOU DESCRIBE YOUR STIFFNESS ON AVERAGE: MILD
UNUSUAL FATIGUE: N
AGITATION: N
DRYNESS OF MOUTH: N
JOINT PAIN: Y
MORNING STIFFNESS IN LOWER BACK: Y
CHEST PAIN: N
DIFFICULTY SWALLOWING: N
NUMBNESS OR TINGLING IN HANDS OR FEET: Y
SUN SENSITIVE (SUN ALLERGY): N

## 2024-11-22 NOTE — PATIENT INSTRUCTIONS
I am ordering an xray of your hand and pelvis    I will inform you of the result    If you have any flare,   please reach out to me and take a picture of the joint you think is swollen    Follow up x 2-3 months

## 2024-11-22 NOTE — PROGRESS NOTES
Subjective   Patient ID: [id number]   Viktoriya Carroll is a 49 y.o. female who presents for Abnormal Lab and Joint stiffness.  HPI  Previously seen by another rheumatologist  Labelled with inflammatory arthritis  With history of chronic LBP , DJD spine, MITCHELL, MGUS and hypothyroidism  For hand arthralgias x 2 years  Mentions feeling stiff in the am , can be upto an hour  With pain while making a fist symptoms get better as the day progresses  Issues with opening jars, door knobs feels discomfort   No swelling of any knuckles per say   Also c/o chronic lower back pain, has been diagnosed with piriformis syndrome   And pain radiates to right buttock but no sciatica  Occasional nocturnal awakening from pain at night  Also c/o stiffness in the lower back  No alternating back pain    She tested negative for ANSELMO/ACPA and RF with normal labs and inflammatory markers  Did not improve much with NSAIDs  Recently had a maculopapular eruption, was seen by dermatologist  And prescribed prednisone 40 mg daily with a taper  Rash since then dissipated, also felt good, did not have joint complains during the time she was on prednisone  In October  Since then she feels achy   Denies oral or genital ulcers, RP, alopecia, pleurisy, malar rash, photosensitive rash, fatigue,  Denies enthesitis, dactylitis, uveitis or IBD symptoms  Nurse by profession    ROS  Constitutional: Denies fever, chills, weight loss, night sweats or headaches  Eyes: Denies dry eyes, blurry vision, redness or pain or photophobia  ENT: Denies dry mouth, dental loss, loss of taste, nasal or oral ulcers, jaw claudication, difficulty swallowing, nasal crusting or recurrent sinus infections   Cardiovascular: Denies chest pain, palpitations, orthopnea  Respiratory: Denies shortness of breath, cough, asthma, or recurrent respiratory infections  Gastrointestinal: Denies dysphagia, nausea, vomiting, heartburn, abdominal pain, constipation, diarrhea, melena or  hematochezia  Genitourinary: No recurrent urinary infections or STDs, no genital or anal ulcers.  Integumentary: Denies photosensitivity, rash or lesions, Raynaud's phenomenon, skin tightening, digital ulcers, psoriatic lesions, or alopecia  Neurological: Denies any numbness or tingling, muscle weakness, or incontinence   Hematologic/Lymphatic: Denies bleeding, bruising, history of clots (arterial or venous), or abortions/miscarriages/pregnancy complications   FHx: No family history of autoimmune diseases       Patient Active Problem List   Diagnosis    Anemia, iron deficiency    Degenerative disc disease, lumbar    Dyslipidemia    Leg weakness    Low back pain    Lumbar radiculopathy    Lumbar sprain    Nail lesion    Abdominal pain    Chest pain    Pelvic pain    Primary hypothyroidism        Past Medical History:   Diagnosis Date    Encounter for immunization 10/22/2013    Need for prophylactic vaccination with measles-mumps-rubella (MMR) vaccine    Encounter for immunization 11/22/2013    Need for hepatitis B vaccination    Other conditions influencing health status     History Of ___ Previous Pregnancies        Past Surgical History:   Procedure Laterality Date    OTHER SURGICAL HISTORY  02/05/2013    Surgery        Social History     Socioeconomic History    Marital status:      Spouse name: Not on file    Number of children: Not on file    Years of education: Not on file    Highest education level: Not on file   Occupational History    Not on file   Tobacco Use    Smoking status: Never    Smokeless tobacco: Never   Vaping Use    Vaping status: Never Used   Substance and Sexual Activity    Alcohol use: Yes    Drug use: Never    Sexual activity: Defer   Other Topics Concern    Not on file   Social History Narrative    Not on file     Social Drivers of Health     Financial Resource Strain: Not on file   Food Insecurity: Not on file   Transportation Needs: Not on file   Physical Activity: Not on file    Stress: Not on file   Social Connections: Not on file   Intimate Partner Violence: Not on file   Housing Stability: Not on file        Allergies   Allergen Reactions    Penicillins Unknown          Current Outpatient Medications:     clobetasol (Temovate) 0.05 % cream, APPLY TWICE DAILY TO RASH FOR 4 WEEKS, Disp: , Rfl:     famotidine (Pepcid) 40 mg tablet, Take 1 tablet (40 mg) by mouth once daily. (Patient taking differently: Take 1 tablet (40 mg) by mouth if needed.), Disp: 30 tablet, Rfl: 1    magnesium glycinate 100 mg magnesium capsule, Take 2 capsules (200 mg) by mouth once daily., Disp: , Rfl:     OMEG3-DHA-EPA-FISH OIL-L.CASEI ORAL, Take by mouth once daily., Disp: , Rfl:     tacrolimus (Protopic) 0.1 % ointment, APPLY THIN AMOUNT TO AFFECTED AREAS WHEN NOT USING TRIAMCINOLONE. OK TO APPLY TO THE FACE, Disp: , Rfl:     triamcinolone (Kenalog) 0.1 % cream, APPLY TWICE DAILY TO AFFECTED AREA TWO WEEKS ON, TWO WEEKS OFF. AVOID FACE AND GROIN., Disp: , Rfl:     Synthroid 75 mcg tablet, Take 1 tablet (75 mcg) by mouth once daily., Disp: 90 tablet, Rfl: 1    TURMERIC ORAL, Take by mouth once daily., Disp: , Rfl:        Objective     Visit Vitals  Temp 36.4 °C (97.5 °F)        Physical Exam  No evidence of synovitis in the hands or wrists  0 TJC 0 SJC  No stigmata of chronic inflammatory arthritis on hand exam  DEE/FADIR negative  No enthesitis  No rashes    General: AAOx3, Cooperative  Head: normocephalic, atraumatic  Eyes: EOMI, conjunctiva clear, sclera white, anicteric  Ears: no redness, swelling, tenderness  Nose: no deformity, no crusting   Throat/Mouth: No oral deformities, no cheek swelling, mucosa appear moist, no oral ulcers noted or loss of dentition   Neck/Lymph: FROM, trachea midline  Lungs: chest expansion symmetric. No respiratory distress.   Heart: RRR  Neuro: CN II-XII grossly intact, no focal deficit  Skin: No rashes, ulcers or photosensitive areas  MSK: Upper Extremities:  Hand/Fingers: No  "erythema, swelling, tenderness or warmth at DIP, PIP, or MCP joints, FROM grossly. Good hand . No nodules. No deformities   Wrists: No erythema, swelling, warmth or tenderness at wrist, FROM grossly  Elbows: No tenderness, swelling, erythema or warmth at elbows, FROM grossly. No nodules   Shoulders: No swelling, erythema, tenderness or warmth at shoulders. FROM  Lower Extremities:   Hips: No obvious deformities. No joint tenderness, normal ROM grossly. Log roll test negative bilaterally. Rod test is negative bilaterally. No trochanteric bursae TTP  Knees: No tenderness, deformities, swelling, rashes, or warmth, normal ROM grossly. No crepitus, no pes anserine bursa TTP   Ankles: No deformities, tenderness, edema, erythema, ulceration, or warmth at the ankle  Feet: Negative MTP squeeze. Normal ROM grossly.   Spine: No spinal tenderness to palpation. No SI joint tenderness. Gaenslen test negative       [unfilled]      Lab Results   Component Value Date    WBC 4.9 10/22/2024    HGB 13.6 10/22/2024    HCT 41.1 10/22/2024    MCV 93 10/22/2024     10/22/2024        Chemistry    Lab Results   Component Value Date/Time     10/22/2024 0916    K 4.3 10/22/2024 0916     10/22/2024 0916    CO2 30 10/22/2024 0916    BUN 18 10/22/2024 0916    CREATININE 0.83 10/22/2024 0916    Lab Results   Component Value Date/Time    CALCIUM 9.6 10/22/2024 0916    ALKPHOS 51 10/22/2024 0916    AST 14 10/22/2024 0916    ALT 10 10/22/2024 0916    BILITOT 0.5 10/22/2024 0916           Lab Results   Component Value Date    CRP 0.29 11/13/2017      No results found for: \"ANSELMO\", \"RF\", \"SEDRATE\"   No results found for: \"CKTOTAL\"  Lab Results   Component Value Date    NEUTROABS 2.92 10/22/2024      Lab Results   Component Value Date    FERRITIN 29 07/18/2023      No results found for: \"HEPATOT\", \"HEPAIGM\", \"HEPBCIGM\", \"HEPBCAB\", \"HBEAG\", \"HEPCAB\"   Lab Results   Component Value Date    ALT 10 10/22/2024    AST 14 10/22/2024 " "   ALKPHOS 51 10/22/2024    BILITOT 0.5 10/22/2024      No results found for: \"PPD\"   No results found for: \"URICACID\"   Lab Results   Component Value Date    CALCIUM 9.6 10/22/2024      Lab Results   Component Value Date    SPEP CANCELED 07/22/2023    UPEP NORMAL 01/28/2019      No results found for: \"ALBUR\", \"[id number]\"   .last          Vascular US lower extremity venous duplex right  Narrative: Interpreted By:  YOLETTE CORONA MD  MRN: [id number]  Patient Name: KADEEM PULIDO     STUDY:  DUPLEX LOWER EXTREMITY VEINS, RIGHT, UNILATERAL;  6/23/2023 9:51 am     INDICATION:  right leg sweling posterior to right knee.     COMPARISON:  None.     ACCESSION NUMBER(S):  [id number]     ORDERING CLINICIAN:  AGUSTINA FRANKLIN     TECHNIQUE:  Vascular ultrasound of the  right lower extremity was performed. Real  time compression views as well as Gray scale, color Doppler and  spectral Doppler waveform analysis was performed.     FINDINGS:  Evaluation of the visualized portions of the  right common femoral  vein, proximal, mid, and distal femoral vein, and popliteal vein were  performed.  Evaluation of the visualized portions of the  posterior  tibial and peroneal veins were also performed.  In addition,  evaluation of the contralateral common femoral vein was performed.     Limitations:  None     The evaluated veins demonstrate normal compressibility. There is  intact venous flow demonstrating normal respiratory variability and  normal augmentation of flow with calf compression. Therefore, there  is no ultrasonographic evidence for deep vein thrombosis within the  evaluated veins.     Impression: No sonographic evidence for deep vein thrombosis within the evaluated  veins of the right lower extremity.     === 10/07/19 ===    - Impression -  No acute osseous abnormality.   === 03/02/21 ===    MR LUMBAR SPINE WO CONTRAST    - Impression -  Minimal degenerative changes of the lumbar spine as above described  worst at L4-5 without " evidence of high-grade spinal canal or  neuroforaminal stenosis.    I personally reviewed the images/study and Dr. Peterson's  interpretation and I agree with the findings as stated. This study  was analyzed and interpreted at [facility], [place], [place].          Assessment/Plan   Diagnoses and all orders for this visit:  Arthralgia of both hands  No evidence of synovitis  No stigmata of chronic inflammatory arthritis  By description her symptoms are not palindromic but daily  ANSELMO/ACPA/RF neg CRP/ESR normal  Hx of MGUS   Otherwise exam and labs also do not show evidence of any autoimmune disease    She has 2-3 years of symptom duration without an objective evidence of synovitis by multiple visits to rheumatologists  And today I did not appreciate any inflammatory arthritis    I advised her we do not have to start any immunosuppression as was advised previously to her.  She can use NSAIDs for now  I will check xray of her hands and lower back   She is advised to schedule an appointment when she thinks she is in a flare and take picture of the swollen joint/ painful joint    -     Referral to Rheumatology  -     XR hand 3+ views bilateral; Future  -     XR sacroiliac joints 3+ views; Future  -     Follow Up In Rheumatology; Future         William Matos MD   Plan, including risks and benefits, was discussed with the patient, informed on how to reach us.     To schedule an appointment, call  [phone] [place] or call [phone] for [facility]

## 2025-01-21 ENCOUNTER — OFFICE VISIT (OUTPATIENT)
Dept: PRIMARY CARE | Facility: CLINIC | Age: 50
End: 2025-01-21
Payer: COMMERCIAL

## 2025-01-21 VITALS
WEIGHT: 156 LBS | SYSTOLIC BLOOD PRESSURE: 110 MMHG | DIASTOLIC BLOOD PRESSURE: 72 MMHG | BODY MASS INDEX: 25.18 KG/M2 | TEMPERATURE: 99 F | OXYGEN SATURATION: 96 % | HEART RATE: 73 BPM

## 2025-01-21 DIAGNOSIS — M25.549 ARTHRALGIA OF HAND, UNSPECIFIED LATERALITY: ICD-10-CM

## 2025-01-21 DIAGNOSIS — M54.81 OCCIPITAL NEURALGIA OF LEFT SIDE: Primary | ICD-10-CM

## 2025-01-21 PROCEDURE — 1036F TOBACCO NON-USER: CPT | Performed by: FAMILY MEDICINE

## 2025-01-21 PROCEDURE — 99214 OFFICE O/P EST MOD 30 MIN: CPT | Performed by: FAMILY MEDICINE

## 2025-01-21 RX ORDER — PREDNISONE 20 MG/1
TABLET ORAL
Qty: 18 TABLET | Refills: 0 | Status: SHIPPED | OUTPATIENT
Start: 2025-01-21

## 2025-01-21 RX ORDER — AMITRIPTYLINE HYDROCHLORIDE 25 MG/1
25 TABLET, FILM COATED ORAL NIGHTLY
Qty: 14 TABLET | Refills: 0 | Status: SHIPPED | OUTPATIENT
Start: 2025-01-21 | End: 2026-01-21

## 2025-01-21 RX ORDER — MELOXICAM 15 MG/1
15 TABLET ORAL DAILY
Qty: 30 TABLET | Refills: 1 | Status: SHIPPED | OUTPATIENT
Start: 2025-01-21

## 2025-01-21 NOTE — PROGRESS NOTES
"Subjective   Patient ID: Viktoriya Carroll is a 49 y.o. female who presents for Shooting Head Pain (EP. Here for shooting pains in head. Has tried Advil.).  HPI  \"Think I have a bout of occipital neuralgia\" x 4 days, has had this before, Advil typically helps but not helping this time (take 2-3 every 6 hours)     Shooting pain that goes up back of neck/base of skull and shoots up to back of head  A breeze can hurt or if breathes in deeply can hurt     Will wake her up from a \"dead sleep\", sleep has been bad because of this    Denies URI symptoms, no headache, no face pain     Objective   /72 (BP Location: Left arm)   Pulse 73   Temp 37.2 °C (99 °F) (Oral)   Wt 70.8 kg (156 lb)   SpO2 96%   BMI 25.18 kg/m²    Physical Exam  Alert, well-appearing.    HEENT: OP clear. Sclera white. Pupils equal and round. EACs and TMs clear.    Neck: Supple. No palpable masses. Thyroid was not enlarged.    CVS: RRR, no murmurs.     Respiratory: Clear and equal breath sounds.     Neuro: Mental status: The patient was alert and interactive/cooperative. Affect appropriate. CN: PERRL. EOMI. Normal and symmetric facial strength. Palate elevates symmetrically. Normal strength of shoulder shrug and neck turning. Tongue protrusion full and midline. Normal coordination. Finger-nose intact. Heel to shin testing intact. Gait normal. Negative Romberg.    Assessment/Plan   Diagnoses and all orders for this visit:  Occipital neuralgia of left side  -     predniSONE (Deltasone) 20 mg tablet; 3 tabs x 3 days, 2 tabs x 3 days, and 1 tab x 3 days  -     amitriptyline (Elavil) 25 mg tablet; Take 1 tablet (25 mg) by mouth once daily at bedtime.    To call if does not resolve     Renetta Saeed MD  Family Medicine   [facility]  "

## 2025-03-16 DIAGNOSIS — M25.549 ARTHRALGIA OF HAND, UNSPECIFIED LATERALITY: ICD-10-CM

## 2025-03-18 RX ORDER — MELOXICAM 15 MG/1
15 TABLET ORAL DAILY
Qty: 30 TABLET | Refills: 1 | Status: SHIPPED | OUTPATIENT
Start: 2025-03-18

## 2025-05-01 LAB
ALBUMIN SERPL-MCNC: 4.4 G/DL (ref 3.6–5.1)
ALP SERPL-CCNC: 39 U/L (ref 31–125)
ALT SERPL-CCNC: 7 U/L (ref 6–29)
ANION GAP SERPL CALCULATED.4IONS-SCNC: 10 MMOL/L (CALC) (ref 7–17)
AST SERPL-CCNC: 13 U/L (ref 10–35)
BASOPHILS # BLD AUTO: 40 CELLS/UL (ref 0–200)
BASOPHILS NFR BLD AUTO: 1.1 %
BILIRUB SERPL-MCNC: 0.6 MG/DL (ref 0.2–1.2)
BUN SERPL-MCNC: 14 MG/DL (ref 7–25)
CALCIUM SERPL-MCNC: 9.3 MG/DL (ref 8.6–10.2)
CHLORIDE SERPL-SCNC: 103 MMOL/L (ref 98–110)
CHOLEST SERPL-MCNC: 247 MG/DL
CHOLEST/HDLC SERPL: 4.3 (CALC)
CO2 SERPL-SCNC: 26 MMOL/L (ref 20–32)
CREAT SERPL-MCNC: 0.8 MG/DL (ref 0.5–0.99)
EGFRCR SERPLBLD CKD-EPI 2021: 91 ML/MIN/1.73M2
EOSINOPHIL # BLD AUTO: 50 CELLS/UL (ref 15–500)
EOSINOPHIL NFR BLD AUTO: 1.4 %
ERYTHROCYTE [DISTWIDTH] IN BLOOD BY AUTOMATED COUNT: 12.1 % (ref 11–15)
GLUCOSE SERPL-MCNC: 81 MG/DL (ref 65–99)
HCT VFR BLD AUTO: 40.3 % (ref 35–45)
HDLC SERPL-MCNC: 58 MG/DL
HGB BLD-MCNC: 13.3 G/DL (ref 11.7–15.5)
LDLC SERPL CALC-MCNC: 166 MG/DL (CALC)
LYMPHOCYTES # BLD AUTO: 1400 CELLS/UL (ref 850–3900)
LYMPHOCYTES NFR BLD AUTO: 38.9 %
MCH RBC QN AUTO: 30.5 PG (ref 27–33)
MCHC RBC AUTO-ENTMCNC: 33 G/DL (ref 32–36)
MCV RBC AUTO: 92.4 FL (ref 80–100)
MONOCYTES # BLD AUTO: 248 CELLS/UL (ref 200–950)
MONOCYTES NFR BLD AUTO: 6.9 %
NEUTROPHILS # BLD AUTO: 1861 CELLS/UL (ref 1500–7800)
NEUTROPHILS NFR BLD AUTO: 51.7 %
NONHDLC SERPL-MCNC: 189 MG/DL (CALC)
PLATELET # BLD AUTO: 325 THOUSAND/UL (ref 140–400)
PMV BLD REES-ECKER: 9.3 FL (ref 7.5–12.5)
POTASSIUM SERPL-SCNC: 4.4 MMOL/L (ref 3.5–5.3)
PROT SERPL-MCNC: 7 G/DL (ref 6.1–8.1)
RBC # BLD AUTO: 4.36 MILLION/UL (ref 3.8–5.1)
SODIUM SERPL-SCNC: 139 MMOL/L (ref 135–146)
T3FREE SERPL-MCNC: 3 PG/ML (ref 2.3–4.2)
T4 FREE SERPL-MCNC: 1.4 NG/DL (ref 0.8–1.8)
TRIGL SERPL-MCNC: 111 MG/DL
TSH SERPL-ACNC: 1.45 MIU/L
WBC # BLD AUTO: 3.6 THOUSAND/UL (ref 3.8–10.8)

## 2025-05-02 ENCOUNTER — APPOINTMENT (OUTPATIENT)
Dept: PRIMARY CARE | Facility: CLINIC | Age: 50
End: 2025-05-02
Payer: COMMERCIAL

## 2025-05-02 VITALS
OXYGEN SATURATION: 98 % | DIASTOLIC BLOOD PRESSURE: 74 MMHG | SYSTOLIC BLOOD PRESSURE: 118 MMHG | HEIGHT: 66 IN | BODY MASS INDEX: 24.43 KG/M2 | WEIGHT: 152 LBS | HEART RATE: 68 BPM | TEMPERATURE: 98 F

## 2025-05-02 DIAGNOSIS — Z00.00 ROUTINE GENERAL MEDICAL EXAMINATION AT A HEALTH CARE FACILITY: Primary | ICD-10-CM

## 2025-05-02 DIAGNOSIS — E03.9 HYPOTHYROIDISM, UNSPECIFIED: ICD-10-CM

## 2025-05-02 DIAGNOSIS — K21.9 GASTROESOPHAGEAL REFLUX DISEASE WITHOUT ESOPHAGITIS: ICD-10-CM

## 2025-05-02 DIAGNOSIS — E78.5 DYSLIPIDEMIA: ICD-10-CM

## 2025-05-02 PROCEDURE — 99396 PREV VISIT EST AGE 40-64: CPT | Performed by: INTERNAL MEDICINE

## 2025-05-02 PROCEDURE — 1036F TOBACCO NON-USER: CPT | Performed by: INTERNAL MEDICINE

## 2025-05-02 PROCEDURE — 3008F BODY MASS INDEX DOCD: CPT | Performed by: INTERNAL MEDICINE

## 2025-05-02 RX ORDER — LEVOTHYROXINE SODIUM 75 UG/1
75 TABLET ORAL DAILY
Qty: 30 TABLET | Refills: 5 | Status: SHIPPED | OUTPATIENT
Start: 2025-05-02

## 2025-05-02 ASSESSMENT — ENCOUNTER SYMPTOMS
OCCASIONAL FEELINGS OF UNSTEADINESS: 0
LOSS OF SENSATION IN FEET: 0
DEPRESSION: 0

## 2025-05-02 NOTE — PROGRESS NOTES
Subjective   Patient ID: Viktoriya Carroll is a 49 y.o. female who presents for Annual Exam (EP.  Annual exam.  Labs done.  Nausea in mornings.).  HPI  Viktoriya Carroll is here for annual check-up.    History of Present Illness  Viktoriya Carroll is a 49 year old female who presents for an annual physical exam.    She has a history of hypothyroidism, well-controlled with Synthroid 75 mcg daily. Her thyroid levels are normal, and she refills her medication monthly at [facility] and [facility].    Over the past two weeks, she has experienced gastrointestinal symptoms, including heartburn, indigestion, and severe nausea. The nausea is intense, with episodes of uncertainty about vomiting or diarrhea. She has taken a H2 blocker  twice, which provided some relief, and has been avoiding coffee and monitoring her diet. Despite these measures, nausea persists, and her bowel habits have changed from constipation to loose stools, though without any color change or blood. A colonoscopy last year was normal.    Recent blood work shows a slightly low white blood cell count, which she has experienced before, and a high cholesterol level with a total cholesterol of 247 mg/dL and LDL of 166 mg/dL. She acknowledges difficulty in maintaining a healthy diet and mentions stress and limited food intake as contributing factors. She is lactose intolerant but consumes a lot of dairy.    She is under hematological surveillance for anemia and potential monoclonal gammopathy of undetermined significance (MGUS), though there is some disagreement among specialists about the diagnosis, with one suggesting rheumatoid arthritis. She has seen two rheumatologists, one of whom disagreed with the diagnosis. She has a history of autoimmune issues, including hypothyroidism.    She had a recent eye exam where blood was noted on the outside of her eyeballs, and she is scheduled for a follow-up in six months. She drinks one cup of coffee a day and does not  "consume alcohol. She is not taking any supplements or special drinks.       Reported Health good  indigestion nausea  feels like diarrhea might happent but does not  tried ppi and helped and then got worse  off     Dental visits reg  Vision checks reg    Diet healthy   Exercise reg  Caffeine  1c  but heartburnTobacco   Alcohol  mnimal    Female : Menstrual status /pregnancy status  reg menses  needs new gyn    Colorectal cancer screening  2024 [place]         Review of Systems  GENERAL - Denies fever, fatigue or chills  SKIN - Denies rash, new skin lesions, or change in moles  EYES - Denies blurred vision, or change in visual acuity  EARS - Denies ear pain, discharge, ringing, or difficulty hearing  NOSE - Denies nasal congestion, discharge, or bleeding  MOUTH - Denies sore throat, postnasal drip or painful/difficulty swallowing  NECK - Denies pain or swelling  RESPIRATORY - Denies shortness of breath, cough, wheezing  CARDIOVASCULAR - Denies palpitations, chest pain, orthopnea, peripheral edema, syncope or claudication  GASTROINTESTINAL - Denies nausea, vomiting, diarrhea, constipation, abdominal pain, melena and or bright red blood  GENITOURINARY - Denies dysuria, frequency of urination, urgency, or hesitancy  MUSCULOSKELETAL - Denies joint or muscle pain, or back pain  NEUROLOGICAL - Denies localized numbness, weakness, or tingling  PSYCHIATRIC - Denies depression, anxiety, substance abuse, suicidal or homicidal ideation  ENDOCRINE - Denies heat or cold intolerance, weight loss or gain, increasing thirst  HEMATO-IMMUNOLOGIC - Denies easy bruising, bleeding, oral ulcerations or recurrent infections      Objective   /74   Pulse 68   Temp 36.7 °C (98 °F) (Oral)   Ht 1.676 m (5' 6\")   Wt 68.9 kg (152 lb)   SpO2 98%   BMI 24.53 kg/m²      Physical Exam  CONSTITUTIONAL - well nourished, well developed, looks like stated age, in no acute distress, not ill-appearing, and not tired appearing  SKIN - normal " skin color and pigmentation, normal skin turgor without rash, lesions, or nodules visualized  HEAD - no trauma, normocephalic  EYES - pupils are equal and reactive to light, extraocular muscles are intact, and normal external exam  ENT - TM's intact, no injection, no signs of infection, uvula midline, normal tongue movement and throat normal, no exudate, nasal passage without discharge and patent  NECK - supple without rigidity, no neck mass was observed, no thyromegaly or thyroid nodules  CHEST - clear to auscultation, no wheezing, no crackles and no rales, good effort  CARDIAC - regular rate and regular rhythm, no skipped beats, no murmur  ABDOMEN - no organomegaly, soft, nontender, nondistended, normal bowel sounds, no guarding/rebound/rigidity, negative McBurney sign and negative Bello sign  EXTREMITIES - no edema, no deformities  NEUROLOGICAL - normal gait, normal balance, normal motor, no ataxia, DTRs equal and symmetrical; alert, oriented and no focal signs  PSYCHIATRIC - alert, pleasant and cordial, age-appropriate  IMMUNOLOGIC - no cervical lymphadenopathy    Assessment/Plan   Diagnoses and all orders for this visit:  Routine general medical examination at a health care facility  -     Thyroid Stimulating Hormone; Future  -     Thyroxine, Free; Future  -     Triiodothyronine, Free; Future  -     CBC and Auto Differential; Future  -     Comprehensive Metabolic Panel; Future  -     Lipid Panel; Future  Hypothyroidism, unspecified  -     Synthroid 75 mcg tablet; Take 1 tablet (75 mcg) by mouth once daily.  -     Thyroid Stimulating Hormone; Future  -     Thyroxine, Free; Future  -     Triiodothyronine, Free; Future  Dyslipidemia  Gastroesophageal reflux disease without esophagitis        Assessment & Plan  Gastroesophageal reflux disease without esophagitis  She experiences heartburn, indigestion, and nausea, worsening over the past two weeks. Symptoms improved with a previously prescribed PPI but were not  taken consistently. Stress and dietary factors may contribute to symptoms.  - Take H2 blocker  daily for 8-12 weeks to allow for healing of potential stomach inflammation.  - If symptoms persist, consider referral to a GI specialist.  - Encourage dietary modifications and monitor caffeine intake.    Anemia, unspecified  Anemia is monitored by a hematologist. Differential diagnosis includes MGUS versus rheumatoid arthritis, with the current suspicion leaning towards rheumatoid arthritis. Blood work is monitored to rule out blood cell dyscrasias. Autoimmune issues are suspected due to existing hypothyroidism.  - Continue follow-up with hematologist for anemia and monitoring of blood cell dyscrasias.  Slight low wbc, will be seeing hematology in FU    Hypothyroidism, unspecified  Thyroid function is well-controlled with Synthroid. Hypothyroidism can contribute to hyperlipidemia if not controlled, but this is not the case here.  - Continue Synthroid 75 mcg orally daily.  - Refill Synthroid prescription at [facility].    Hyperlipidemia  Lipid panel shows elevated cholesterol levels with total cholesterol at 247 mg/dL and LDL at 166 mg/dL. She is mindful of diet and exercises regularly. There is a concern for genetic predisposition to hyperlipidemia.  - Monitor lipid levels.  - Encourage dietary modifications and regular exercise to increase HDL levels.  - Consider treatment if lipid levels do not improve.       Gwendolyn Mercado MD    This medical note was created with the assistance of artificial intelligence (AI) for documentation purposes. The content has been reviewed and confirmed by the healthcare provider for accuracy and completeness. Patient consented to the use of audio recording and use of AI during their visit.

## 2025-05-23 DIAGNOSIS — M25.549 ARTHRALGIA OF HAND, UNSPECIFIED LATERALITY: ICD-10-CM

## 2025-05-23 RX ORDER — MELOXICAM 15 MG/1
15 TABLET ORAL DAILY
Qty: 30 TABLET | Refills: 1 | Status: SHIPPED | OUTPATIENT
Start: 2025-05-23

## 2025-07-23 DIAGNOSIS — M25.549 ARTHRALGIA OF HAND, UNSPECIFIED LATERALITY: ICD-10-CM

## 2025-07-23 RX ORDER — MELOXICAM 15 MG/1
15 TABLET ORAL DAILY
Qty: 30 TABLET | Refills: 1 | Status: SHIPPED | OUTPATIENT
Start: 2025-07-23

## 2025-10-27 ENCOUNTER — APPOINTMENT (OUTPATIENT)
Dept: PRIMARY CARE | Facility: CLINIC | Age: 50
End: 2025-10-27
Payer: COMMERCIAL

## 2026-05-04 ENCOUNTER — APPOINTMENT (OUTPATIENT)
Dept: PRIMARY CARE | Facility: CLINIC | Age: 51
End: 2026-05-04
Payer: COMMERCIAL